# Patient Record
Sex: FEMALE | Race: WHITE | URBAN - METROPOLITAN AREA
[De-identification: names, ages, dates, MRNs, and addresses within clinical notes are randomized per-mention and may not be internally consistent; named-entity substitution may affect disease eponyms.]

---

## 2017-11-09 ENCOUNTER — HOSPITAL ENCOUNTER (OUTPATIENT)
Dept: OTHER | Age: 32
Discharge: HOME | End: 2018-01-14
Attending: OBSTETRICS & GYNECOLOGY | Admitting: OBSTETRICS & GYNECOLOGY

## 2018-06-26 ENCOUNTER — HOSPITAL ENCOUNTER (OUTPATIENT)
Dept: OTHER | Age: 33
Discharge: HOME | End: 2018-06-30
Attending: ADVANCED PRACTICE MIDWIFE | Admitting: ADVANCED PRACTICE MIDWIFE

## 2018-06-26 ASSESSMENT — PAIN DESCRIPTION - PAIN TYPE: TYPE: CHRONIC PAIN

## 2018-06-26 ASSESSMENT — PAIN DESCRIPTION - LOCATION: LOCATION: VAGINA

## 2018-06-26 ASSESSMENT — PAIN SCALES - GENERAL: PAINLEVEL_OUTOF10: 4

## 2018-06-26 NOTE — PROGRESS NOTES
time    Objective      Exercises  Exercise 1: Diaphragmatic breathing - supine - coordinating with pelvic floor muscle activities - reviewed need to perform while seated on toilet to promote pelvic floor relaxation to maximize elimination of urine and stool  Exercise 2: Pelvic floor contraction/elevation - supine - transvaginal manual feedback - HOLD - 1-2 second hold - 5 reps - verbal and tactile cues to coordinate with pelvic floor muscles, allowed patient to use buttocks muscles to encourage overflow to pelvic floor muscles due to weak contraction  Exercise 3: Pelvic floor contraction/elevation -  supine - transvaginal manual feedback - QUICK FLICKS - 3 reps x 2 but sluggish when fully relaxing between contractions - more time spent with relaxation phase compared to contraction phase - difficulty eliciting muscles but improved with cues for breathing technique with quick puffs of air during exhalation/contractions  Pelvic Floor  Introitus: moderated anterior bulge - grade I cystocele noted which disrupts initial penetration  Perineal Descent: naturally weak muscles with limited perineal descent  Skin Condition: intact  Excursion: limited elevation of pelvic floor muscles due to weakness/impaired muscle control  Prolapse Test: grade I cystocele confirmed  Internal Clock: mild to moderate tenderness in L posterior lateral pelvic floor muscles  Sensation: intact to light touch   Muscle Power: 1  Muscle Endurance (Seconds): 1 Seconds  Number Reps to Fatigue: 5  Muscle Flicks: 3  Quality of Contraction: poor, limited excursion, mild tightening of sphincters with no lift   Coordination: Knack with moderate descent of pelvic organs/cystocele  Pelvic Floor External Observations  Voluntary Contraction: Present  Involuntary Contraction: Absent  Involuntary relaxation: Present  Perineal Descent: Rest: Present  Perineal Descent Bearing Down: Present     Assessment   Conditions Requiring Skilled Therapeutic Intervention  Body with diaphragmatic breathing, progress to advanced positions as able; assess behavior modification strategies to promote healthy bowel/bladder habits and elimination  Current Treatment Recommendations: Strengthening, Endurance Training, Neuromuscular Re-education, Manual Therapy - Soft Tissue Mobilization, Pain Management, Home Exercise Program, Patient/Caregiver Education & Training, Equipment Evaluation, Education, & procurement, Modalities, Positioning  Plan Comment: See patient every 1-2 weeks initially to ensure patient is performing pelvic floor muscle exercises correctly and then taper, determining frequency of treatments based on progress, for a total for 8-10 sessions. Next scheduled appointment on Tuesday, 7/10/18 at [address] [place] [address].     G-Code  PT G-Codes  Functional Assessment Tool Used: PFDI-20  Functional Limitation: Other PT primary  Other PT Primary Current Status (): At least 20 percent but less than 40 percent impaired, limited or restricted  Other PT Primary Goal Status (): At least 1 percent but less than 20 percent impaired, limited or restricted    OutComes Score  POPDI-6 Score : 37.5     CRADI-8 Score : 18.75    MADISON-6 Score : 70.83    PFDI-20 Score: 127.08    Goals  Short term goals  Time Frame for Short term goals: Patient will in 4-5 sessions:  Short term goal 1: Report using 1-2 behavioral modification strategies to improve urinary incontinence through dietary and mechanical changes and maintain/reduce her cystocele. Short term goal 2: Improve score of quality of life index measure, PFDI-20, to less than 100 disability index demonstrating improved quality of life with less life interruptions due to urinary incontinence, allowing patient to fully participate in her social activities, including playing with her young children, with less amounts and frequency of urinary incontinence.    Short term goal 3: Perform HEP for pelvic floor and core muscle groups with minimal direction from therapies

## 2018-07-01 ENCOUNTER — HOSPITAL ENCOUNTER (OUTPATIENT)
Dept: OTHER | Age: 33
Discharge: HOME | End: 2018-07-31
Attending: ADVANCED PRACTICE MIDWIFE | Admitting: ADVANCED PRACTICE MIDWIFE

## 2018-08-01 ENCOUNTER — HOSPITAL ENCOUNTER (OUTPATIENT)
Dept: OTHER | Age: 33
Discharge: HOME | End: 2018-08-31
Attending: ADVANCED PRACTICE MIDWIFE | Admitting: ADVANCED PRACTICE MIDWIFE

## 2018-09-01 ENCOUNTER — HOSPITAL ENCOUNTER (OUTPATIENT)
Dept: OTHER | Age: 33
Discharge: HOME | End: 2018-09-01
Attending: ADVANCED PRACTICE MIDWIFE | Admitting: ADVANCED PRACTICE MIDWIFE

## 2019-03-31 RX ORDER — AMOXICILLIN 250 MG/5ML
250 POWDER, FOR SUSPENSION ORAL 3 TIMES DAILY
Qty: 150 ML | Refills: 0 | Status: SHIPPED | OUTPATIENT
Start: 2019-03-31 | End: 2019-04-10

## 2020-06-25 ENCOUNTER — OFFICE VISIT (OUTPATIENT)
Dept: PRIMARY CARE CLINIC | Age: 35
End: 2020-06-25

## 2020-06-27 LAB
SARS-COV-2: NOT DETECTED
SOURCE: NORMAL

## 2023-04-30 RX ORDER — AZITHROMYCIN 250 MG/1
250 TABLET, FILM COATED ORAL SEE ADMIN INSTRUCTIONS
Qty: 6 TABLET | Refills: 1 | Status: SHIPPED | OUTPATIENT
Start: 2023-04-30 | End: 2023-05-05